# Patient Record
Sex: MALE | Race: WHITE | NOT HISPANIC OR LATINO | Employment: OTHER | URBAN - METROPOLITAN AREA
[De-identification: names, ages, dates, MRNs, and addresses within clinical notes are randomized per-mention and may not be internally consistent; named-entity substitution may affect disease eponyms.]

---

## 2024-10-25 ENCOUNTER — OFFICE VISIT (OUTPATIENT)
Dept: INTERNAL MEDICINE | Facility: CLINIC | Age: 24
End: 2024-10-25

## 2024-10-25 VITALS
OXYGEN SATURATION: 98 % | HEART RATE: 77 BPM | BODY MASS INDEX: 27.09 KG/M2 | HEIGHT: 60 IN | TEMPERATURE: 97.6 F | SYSTOLIC BLOOD PRESSURE: 118 MMHG | WEIGHT: 138 LBS | DIASTOLIC BLOOD PRESSURE: 70 MMHG

## 2024-10-25 DIAGNOSIS — R45.4 OUTBURSTS OF ANGER: ICD-10-CM

## 2024-10-25 DIAGNOSIS — R62.50 DEVELOPMENT DELAY: ICD-10-CM

## 2024-10-25 DIAGNOSIS — Z76.89 ENCOUNTER TO ESTABLISH CARE: Primary | ICD-10-CM

## 2024-10-25 DIAGNOSIS — K59.09 CHRONIC CONSTIPATION: ICD-10-CM

## 2024-10-25 DIAGNOSIS — H69.93 DYSFUNCTION OF BOTH EUSTACHIAN TUBES: ICD-10-CM

## 2024-10-25 DIAGNOSIS — R63.39 DIFFICULTY FEEDING SELF: ICD-10-CM

## 2024-10-25 DIAGNOSIS — J30.2 SEASONAL ALLERGIES: ICD-10-CM

## 2024-10-25 DIAGNOSIS — G40.909 SEIZURE DISORDER: ICD-10-CM

## 2024-10-25 DIAGNOSIS — Z86.69 HISTORY OF ANOXIC BRAIN INJURY: ICD-10-CM

## 2024-10-25 DIAGNOSIS — R47.01 NONVERBAL: ICD-10-CM

## 2024-10-25 RX ORDER — PSEUDOEPHEDRINE HCL 30 MG
100 TABLET ORAL
COMMUNITY
Start: 2024-10-11 | End: 2024-10-25 | Stop reason: SDUPTHER

## 2024-10-25 RX ORDER — DIVALPROEX SODIUM 125 MG/1
500 CAPSULE, COATED PELLETS ORAL 4 TIMES DAILY
COMMUNITY
Start: 2024-10-11 | End: 2024-10-25 | Stop reason: SDUPTHER

## 2024-10-25 RX ORDER — PSEUDOEPHEDRINE HCL 30 MG
100 TABLET ORAL DAILY
Qty: 90 EACH | Refills: 4 | Status: SHIPPED | OUTPATIENT
Start: 2024-10-25

## 2024-10-25 RX ORDER — DIVALPROEX SODIUM 125 MG/1
CAPSULE, COATED PELLETS ORAL
Qty: 120 CAPSULE | Refills: 1 | Status: SHIPPED | OUTPATIENT
Start: 2024-10-25

## 2024-10-25 RX ORDER — LEVETIRACETAM 500 MG/1
500 TABLET ORAL 2 TIMES DAILY
Qty: 60 TABLET | Refills: 1 | Status: SHIPPED | OUTPATIENT
Start: 2024-10-25

## 2024-10-25 RX ORDER — LORATADINE 10 MG/1
10 TABLET ORAL DAILY
Qty: 90 TABLET | Refills: 4 | Status: SHIPPED | OUTPATIENT
Start: 2024-10-25

## 2024-10-25 RX ORDER — LEVETIRACETAM 1000 MG/1
1000 TABLET ORAL 2 TIMES DAILY
COMMUNITY
Start: 2024-10-11 | End: 2024-10-25 | Stop reason: DRUGHIGH

## 2024-10-25 NOTE — PROGRESS NOTES
Subjective   Chief Complaint   Patient presents with    Establish Care       Joel Davis is a 24 y.o. male here today as a new patient to establish care.  Patient has just recently became in the custody of his sister and her partner.  They received emergency guardianship on 10/11/24 due to his mother not taking care of him.  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  He also has a history of a seizure disorder.  He was recently admitted to [facility] due to uncontrolled seizures.  It was determined that his mother had not been taking care of him.  Patient is nonverbal.  He is ambulatory.  He is currently on Depakote and Keppra.  His sister reports that his Keppra was decreased in the hospital due to pt having outbursts of anger.  He is needing referral to neurology and behavioral health.  He was diagnosed with seizures in 2020.  His sister states that his ears chronically drain and she would like to have them checked.    Review of Systems   Constitutional:  Negative for activity change, appetite change and fatigue.   HENT:  Negative for congestion.    Respiratory:  Negative for cough and shortness of breath.    Cardiovascular:  Negative for chest pain and leg swelling.   Gastrointestinal:  Negative for abdominal pain.   Neurological:  Positive for seizures. Negative for dizziness, weakness and confusion.   Psychiatric/Behavioral:  Positive for agitation and behavioral problems. Negative for decreased concentration.        Past Medical History:   Diagnosis Date    Anoxic brain injury     Developmental delay     History of medical problems     Low back pain 2006    Pneumonia 2020    Scoliosis 2001    Seizures 2020     Past Surgical History:   Procedure Laterality Date    EYE SURGERY      SPINE SURGERY       Family History   Problem Relation Age of Onset    Anxiety disorder Mother     COPD Mother     Depression Mother     Drug abuse Mother     Mental illness Mother      "Vision loss Mother     COPD Maternal Grandmother     Hearing loss Maternal Grandmother     Vision loss Maternal Grandmother     Anxiety disorder Sister     Asthma Sister     Depression Sister     Drug abuse Sister     Hearing loss Sister     Mental illness Sister     Vision loss Sister     Mental illness Brother      Social History     Tobacco Use   Smoking Status Never    Passive exposure: Yes   Smokeless Tobacco Never      Social History     Substance and Sexual Activity   Alcohol Use Never      Current Outpatient Medications on File Prior to Visit   Medication Sig    [DISCONTINUED] Divalproex Sodium (DEPAKOTE SPRINKLE) 125 MG capsule Take 4 capsules by mouth 4 (Four) Times a Day.    [DISCONTINUED] docusate sodium 100 MG capsule Take 1 capsule by mouth.    [DISCONTINUED] levETIRAcetam (KEPPRA) 1000 MG tablet Take 1 tablet by mouth 2 (Two) Times a Day.     No current facility-administered medications on file prior to visit.     No Known Allergies    Objective   Vitals:    10/25/24 0916   BP: 118/70   BP Location: Left arm   Patient Position: Sitting   Pulse: 77   Temp: 97.6 °F (36.4 °C)   SpO2: 98%   Weight: 62.6 kg (138 lb)   Height: 149.9 cm (59\")     Body mass index is 27.87 kg/m².    Physical Exam  Vitals and nursing note reviewed.   HENT:      Head: Normocephalic.      Right Ear: Tympanic membrane is not erythematous.      Left Ear: Tympanic membrane is not erythematous.      Ears:      Comments: Fluid noted behind both TM's  Eyes:      Pupils: Pupils are equal, round, and reactive to light.   Cardiovascular:      Rate and Rhythm: Normal rate and regular rhythm.      Pulses: Normal pulses.      Heart sounds: Normal heart sounds. No murmur heard.  Pulmonary:      Effort: Pulmonary effort is normal. No respiratory distress.      Breath sounds: Normal breath sounds.   Skin:     General: Skin is warm and dry.      Capillary Refill: Capillary refill takes less than 2 seconds.   Neurological:      General: No focal " deficit present.      Mental Status: He is alert and oriented to person, place, and time.      Gait: Gait is intact.   Psychiatric:         Attention and Perception: Attention normal.         Mood and Affect: Mood normal.         Behavior: Behavior is cooperative.         Assessment & Plan   Problem List Items Addressed This Visit    None  Visit Diagnoses       Encounter to establish care    -  Primary    Outbursts of anger        Relevant Orders    Ambulatory Referral to Behavioral Health (Completed)    Seizure disorder        Relevant Medications    Divalproex Sodium (DEPAKOTE SPRINKLE) 125 MG capsule    levETIRAcetam (Keppra) 500 MG tablet    Other Relevant Orders    Ambulatory Referral to Neurology    History of anoxic brain injury        Relevant Orders    Ambulatory Referral to Neurology    Ambulatory Referral to Behavioral Health (Completed)    Ambulatory Referral to Occupational Therapy for Evaluation & Treatment    Development delay        Relevant Orders    Ambulatory Referral to Behavioral Health (Completed)    Ambulatory Referral to Occupational Therapy for Evaluation & Treatment    Dysfunction of both eustachian tubes        Relevant Medications    loratadine (Claritin) 10 MG tablet    Nonverbal        Relevant Orders    Ambulatory Referral to Occupational Therapy for Evaluation & Treatment    Difficulty feeding self        Relevant Orders    Ambulatory Referral to Occupational Therapy for Evaluation & Treatment    Chronic constipation        Relevant Medications    docusate sodium 100 MG capsule    Seasonal allergies               Refer to behavioral health for outbursts  Seizures stable on Depakote at this time  Will try to taper Keppra due to outbursts  Refer to neuro for sz mgmt  Refer to OT   Start Loratdine for allergies    Current Outpatient Medications:     Divalproex Sodium (DEPAKOTE SPRINKLE) 125 MG capsule, Take four capsules twice daily, Disp: 120 capsule, Rfl: 1    docusate sodium 100 MG  capsule, Take 1 capsule by mouth Daily., Disp: 90 each, Rfl: 4    levETIRAcetam (Keppra) 500 MG tablet, Take 1 tablet by mouth 2 (Two) Times a Day., Disp: 60 tablet, Rfl: 1    loratadine (Claritin) 10 MG tablet, Take 1 tablet by mouth Daily., Disp: 90 tablet, Rfl: 4       Plan of care reviewed with the patient at the conclusion of today's visit.  Education was provided regarding diagnosis, management, and any prescribed or recommended OTC medications.  Patient verbalized understanding of and agreement with management plan.     Return in about 4 weeks (around 11/22/2024) for Recheck Eustachian tube dysfunction.        OWEN Lyman

## 2024-10-28 ENCOUNTER — PATIENT ROUNDING (BHMG ONLY) (OUTPATIENT)
Dept: INTERNAL MEDICINE | Facility: CLINIC | Age: 24
End: 2024-10-28
Payer: MEDICAID

## 2024-10-28 NOTE — PROGRESS NOTES
A  my chart message has been sent to the patient [place] [facility] [place]

## 2024-11-25 ENCOUNTER — OFFICE VISIT (OUTPATIENT)
Dept: INTERNAL MEDICINE | Facility: CLINIC | Age: 24
End: 2024-11-25
Payer: MEDICAID

## 2024-11-25 VITALS
BODY MASS INDEX: 25.68 KG/M2 | SYSTOLIC BLOOD PRESSURE: 130 MMHG | WEIGHT: 136 LBS | DIASTOLIC BLOOD PRESSURE: 70 MMHG | OXYGEN SATURATION: 99 % | HEIGHT: 61 IN | HEART RATE: 74 BPM | TEMPERATURE: 97.4 F

## 2024-11-25 DIAGNOSIS — Z86.69 HISTORY OF ANOXIC BRAIN INJURY: ICD-10-CM

## 2024-11-25 DIAGNOSIS — J30.2 SEASONAL ALLERGIES: ICD-10-CM

## 2024-11-25 DIAGNOSIS — R62.50 DEVELOPMENT DELAY: ICD-10-CM

## 2024-11-25 DIAGNOSIS — H69.93 DYSFUNCTION OF BOTH EUSTACHIAN TUBES: Primary | ICD-10-CM

## 2024-11-25 DIAGNOSIS — R45.4 OUTBURSTS OF ANGER: ICD-10-CM

## 2024-11-25 PROCEDURE — 99214 OFFICE O/P EST MOD 30 MIN: CPT | Performed by: NURSE PRACTITIONER

## 2024-11-25 PROCEDURE — 1159F MED LIST DOCD IN RCRD: CPT | Performed by: NURSE PRACTITIONER

## 2024-11-25 PROCEDURE — 1160F RVW MEDS BY RX/DR IN RCRD: CPT | Performed by: NURSE PRACTITIONER

## 2024-11-25 NOTE — PROGRESS NOTES
"Chief Complaint   Patient presents with     Eustachian tube dysfunction.         HPI  Joel Davis is a 24 y.o. male presents for follow-up on eustachian tube dysfunction.  He is accompanied by his sister.  He was started on Loratadine at his last appt.  He is also needing a form completed to help with being in an adult .  His outbursts of anger are still bad per the sister.  He has a scheduled appt in December with behavioral health.      The following portions of the patient's history were reviewed and updated as appropriate: allergies, current medications, past family history, past medical history, past social history, past surgical history, and problem list.    Subjective  Review of Systems   Constitutional:  Negative for fever and unexpected weight loss.   HENT:  Negative for congestion and ear pain.    Cardiovascular:  Negative for chest pain.   Gastrointestinal:  Negative for abdominal pain.   Genitourinary:  Negative for dysuria and urinary incontinence.   Neurological:  Negative for weakness and numbness.   Psychiatric/Behavioral:  Positive for agitation. The patient is nervous/anxious.        Objective  Visit Vitals  /70 (BP Location: Right arm, Patient Position: Sitting)   Pulse 74   Temp 97.4 °F (36.3 °C)   Ht 154.9 cm (61\")   Wt 61.7 kg (136 lb)   SpO2 99%   BMI 25.70 kg/m²        Physical Exam  Vitals and nursing note reviewed.   HENT:      Head: Normocephalic.      Right Ear: No middle ear effusion.      Left Ear:  No middle ear effusion.   Eyes:      Pupils: Pupils are equal, round, and reactive to light.   Pulmonary:      Effort: Pulmonary effort is normal. No respiratory distress.   Skin:     General: Skin is warm and dry.      Capillary Refill: Capillary refill takes less than 2 seconds.   Neurological:      General: No focal deficit present.      Mental Status: He is alert and oriented to person, place, and time.      Gait: Gait is intact.   Psychiatric:         Attention and " Perception: Attention normal.         Mood and Affect: Mood normal.         Behavior: Behavior normal.          Procedures     Assessment and Plan  Diagnoses and all orders for this visit:    1. Dysfunction of both eustachian tubes (Primary)    2. History of anoxic brain injury    3. Development delay    4. Seasonal allergies    5. Outbursts of anger    Fluid in ears improved with Claritin  Form completed to help with adult   Allergies appear well controlled on Claritin  Keep appt in Dec with behavioral health for anger issues      Return in about 6 months (around 5/25/2025) for Annual physical.      OWEN Lyman

## 2024-12-06 DIAGNOSIS — G40.909 SEIZURE DISORDER: ICD-10-CM

## 2024-12-06 RX ORDER — DIVALPROEX SODIUM 125 MG/1
CAPSULE, COATED PELLETS ORAL
Qty: 240 CAPSULE | Refills: 5 | Status: SHIPPED | OUTPATIENT
Start: 2024-12-06